# Patient Record
Sex: FEMALE | Race: OTHER | NOT HISPANIC OR LATINO | URBAN - METROPOLITAN AREA
[De-identification: names, ages, dates, MRNs, and addresses within clinical notes are randomized per-mention and may not be internally consistent; named-entity substitution may affect disease eponyms.]

---

## 2024-07-01 ENCOUNTER — LAB OUTSIDE AN ENCOUNTER (OUTPATIENT)
Dept: URBAN - METROPOLITAN AREA CLINIC 98 | Facility: CLINIC | Age: 44
End: 2024-07-01

## 2024-07-01 ENCOUNTER — OFFICE VISIT (OUTPATIENT)
Dept: URBAN - METROPOLITAN AREA CLINIC 98 | Facility: CLINIC | Age: 44
End: 2024-07-01
Payer: COMMERCIAL

## 2024-07-01 VITALS
TEMPERATURE: 97 F | HEART RATE: 77 BPM | WEIGHT: 195 LBS | SYSTOLIC BLOOD PRESSURE: 159 MMHG | BODY MASS INDEX: 34.55 KG/M2 | HEIGHT: 63 IN | DIASTOLIC BLOOD PRESSURE: 83 MMHG

## 2024-07-01 DIAGNOSIS — R15.9 FULL INCONTINENCE OF FECES: ICD-10-CM

## 2024-07-01 DIAGNOSIS — R79.89 ELEVATED LIVER FUNCTION TESTS: ICD-10-CM

## 2024-07-01 PROCEDURE — 99204 OFFICE O/P NEW MOD 45 MIN: CPT | Performed by: INTERNAL MEDICINE

## 2024-07-01 NOTE — HPI-TODAY'S VISIT:
Here on referral from [facility] Rebekah Jones NP for elevated liver tests  A copy of this note will be sent to her attention . per pt - 1.5 yr history of elevated liver tests   labs per note : 2023 alp 173 ast 62 alt 128  2023 alp 153 ast 55 alt 87   has insulin resistance , BMI > 30 : on semaglutide and metformin : lost 35 lbs but insurance stopped paying for WEgovy and gained at back again  . on several Rad supplements  tumeric / curcumin x 1 yr  on minoxidil x 2 yr  sertraline x 3 yrs  bupriorpion recently  no FH liver disease  mother w MM / SCT and father HTN HLD obesity both living . alcohol "socially"   max 2-4 drink / week  did have an US : reportedly neg .   suddenly from cardiac issues CAD at 41 yo - 3yr ago  afterwards : she started paying more attention to her health . been on many supplements

## 2024-07-03 ENCOUNTER — DASHBOARD ENCOUNTERS (OUTPATIENT)
Age: 44
End: 2024-07-03

## 2024-07-03 PROBLEM — 1086911000119107: Status: ACTIVE | Noted: 2024-07-01

## 2024-07-05 LAB
ACTIN (SMOOTH MUSCLE) ANTIBODY: 5
ALPHA-1-ANTITRYPSIN, SERUM: 138
CERULOPLASMIN: 23.7
DEAMIDATED GLIADIN ABS, IGA: 4
DEAMIDATED GLIADIN ABS, IGG: 2
ENDOMYSIAL ANTIBODY IGA: NEGATIVE
FERRITIN, SERUM: 57
GGT: 51
HEPATITIS B SURF AB QUANT: <3.5
IMMUNOGLOBULIN A, QN, SERUM: 236
IMMUNOGLOBULIN E, TOTAL: 65
IMMUNOGLOBULIN G, QN, SERUM: 1044
IMMUNOGLOBULIN M, QN, SERUM: 94
IRON BIND.CAP.(TIBC): 364
IRON SATURATION: 32
IRON: 116
MITOCHONDRIAL (M2) ANTIBODY: <20
PHENOTYPE (PI): (no result)
T-TRANSGLUTAMINASE (TTG) IGA: <2
T-TRANSGLUTAMINASE (TTG) IGG: 4
UIBC: 248

## 2024-07-15 ENCOUNTER — TELEPHONE ENCOUNTER (OUTPATIENT)
Dept: URBAN - METROPOLITAN AREA CLINIC 98 | Facility: CLINIC | Age: 44
End: 2024-07-15

## 2024-07-25 ENCOUNTER — TELEPHONE ENCOUNTER (OUTPATIENT)
Dept: URBAN - METROPOLITAN AREA CLINIC 98 | Facility: CLINIC | Age: 44
End: 2024-07-25